# Patient Record
Sex: MALE | Race: WHITE | Employment: FULL TIME | URBAN - METROPOLITAN AREA
[De-identification: names, ages, dates, MRNs, and addresses within clinical notes are randomized per-mention and may not be internally consistent; named-entity substitution may affect disease eponyms.]

---

## 2018-11-26 ENCOUNTER — LAB ENCOUNTER (OUTPATIENT)
Dept: LAB | Age: 43
End: 2018-11-26
Attending: EMERGENCY MEDICINE
Payer: COMMERCIAL

## 2018-11-26 ENCOUNTER — OFFICE VISIT (OUTPATIENT)
Dept: FAMILY MEDICINE CLINIC | Facility: CLINIC | Age: 43
End: 2018-11-26
Payer: COMMERCIAL

## 2018-11-26 VITALS — WEIGHT: 180 LBS | HEIGHT: 70.5 IN | BODY MASS INDEX: 25.48 KG/M2

## 2018-11-26 DIAGNOSIS — Z13.0 SCREENING FOR ENDOCRINE, NUTRITIONAL, METABOLIC AND IMMUNITY DISORDER: ICD-10-CM

## 2018-11-26 DIAGNOSIS — Z28.21 REFUSED INFLUENZA VACCINE: ICD-10-CM

## 2018-11-26 DIAGNOSIS — Z13.29 SCREENING FOR ENDOCRINE, NUTRITIONAL, METABOLIC AND IMMUNITY DISORDER: ICD-10-CM

## 2018-11-26 DIAGNOSIS — Z23 NEED FOR TDAP VACCINATION: ICD-10-CM

## 2018-11-26 DIAGNOSIS — L82.1 SEBORRHEIC KERATOSES: ICD-10-CM

## 2018-11-26 DIAGNOSIS — Z13.21 SCREENING FOR ENDOCRINE, NUTRITIONAL, METABOLIC AND IMMUNITY DISORDER: ICD-10-CM

## 2018-11-26 DIAGNOSIS — F17.200 SMOKER: ICD-10-CM

## 2018-11-26 DIAGNOSIS — Z13.228 SCREENING FOR ENDOCRINE, NUTRITIONAL, METABOLIC AND IMMUNITY DISORDER: ICD-10-CM

## 2018-11-26 DIAGNOSIS — Z00.00 ENCOUNTER FOR ANNUAL PHYSICAL EXAM: Primary | ICD-10-CM

## 2018-11-26 DIAGNOSIS — Z12.5 PROSTATE CANCER SCREENING: ICD-10-CM

## 2018-11-26 PROCEDURE — 83036 HEMOGLOBIN GLYCOSYLATED A1C: CPT | Performed by: EMERGENCY MEDICINE

## 2018-11-26 PROCEDURE — 36415 COLL VENOUS BLD VENIPUNCTURE: CPT | Performed by: EMERGENCY MEDICINE

## 2018-11-26 PROCEDURE — 90715 TDAP VACCINE 7 YRS/> IM: CPT | Performed by: EMERGENCY MEDICINE

## 2018-11-26 PROCEDURE — 84153 ASSAY OF PSA TOTAL: CPT | Performed by: EMERGENCY MEDICINE

## 2018-11-26 PROCEDURE — 80050 GENERAL HEALTH PANEL: CPT | Performed by: EMERGENCY MEDICINE

## 2018-11-26 PROCEDURE — 80061 LIPID PANEL: CPT | Performed by: EMERGENCY MEDICINE

## 2018-11-26 PROCEDURE — 99386 PREV VISIT NEW AGE 40-64: CPT | Performed by: EMERGENCY MEDICINE

## 2018-11-26 PROCEDURE — 90471 IMMUNIZATION ADMIN: CPT | Performed by: EMERGENCY MEDICINE

## 2018-11-26 NOTE — PROGRESS NOTES
Chief Complaint:   Patient presents with:  Physical: NP annual physical     HPI:   This is a 37year old male who present for a yearly annual exam      WELL-MALE EXAM     1. Age:   37   2. Have you had any of the following problems:         a.  High b • Other (Other) Mother         COPD       Allergies     Shellfish-Derived P*    SWELLING, RESPIRATORY FAILURE  Morphine                PAIN    Comment:Burning sensation      No current outpatient medications on file prior to visit.   No current facility-a safely. Schedule regular appointments with dentist.  Schedule yearly eye exam if you wear glasses/contacts. Yearly Flu Vaccine recommended. Tetanus, Diptheria and Pertussis vaccine should be given every 7-10 years.   Call or come in if there are concerns

## 2018-11-26 NOTE — PATIENT INSTRUCTIONS
Thank you for choosing [facility]  To Do:  FOR [facility] AC    · Recommend Flu shot  · Follow up yearly or as needed  · Have blood tests done  · Need to slow down and eventually stop smoking  · Follow up with dermatology for facial lesion infection – talk with your healthcare provider At routine exams   High cholesterol or triglycerides All men ages 28 and older, and younger men at high risk for coronary artery disease At least every 5 years   HIV All men At routine exams   Obesity All men with your healthcare provider PCV13: 1 dose ages 23 to 72 (protects against 13 types of pneumococcal bacteria)     PPSV23: 1 to 2 doses through age 59, or 1 dose at 72 or older (protects against 23 types of pneumococcal bacteria)      Tetanus/diphtheria/ within a month, and do it.     Keys to your quit plan  · Talk to your healthcare provider about prescription medicines and nicotine replacement products that help stop the urge to smoke.   · Join a support group or quit smoking program. Talking with others as a substitute for professional medical care. Always follow your healthcare professional's instructions.         Tips for Quitting Smoking (Cardiovascular)  Quitting smoking is a gift to yourself, one of the best things you can do to keep your heart diseas tempt you as much. · Speak to smokers around you about your intent to stop smoking so they can show consideration for you and limit their smoking around you. · Angel Ortegaes a list of  “quit benefits” in the spot where you smoke.  Put one on the refrigerator and o

## 2018-11-30 ENCOUNTER — TELEPHONE (OUTPATIENT)
Dept: FAMILY MEDICINE CLINIC | Facility: CLINIC | Age: 43
End: 2018-11-30

## 2018-11-30 DIAGNOSIS — E78.00 ELEVATED CHOLESTEROL: Primary | ICD-10-CM

## 2018-11-30 NOTE — TELEPHONE ENCOUNTER
----- Message from Jason Pozo MD sent at 11/29/2018  4:13 PM CST -----    Total cholesterol and LDL elevated. Please  on dietary changes with low cholesterol low fat diet. Also increase physical activity participate in regular exercise.    R

## 2018-11-30 NOTE — TELEPHONE ENCOUNTER
Left detailed VM for pt regarding results and instructions listed below. Pt instructed to call back with any further questions/concerns. Labs placed for 6 month repeat.

## (undated) NOTE — LETTER
06/29/19        [address]      Dear Dustin December records indicate that you have outstanding lab work and or testing that was ordered for you and has not yet been completed:  Orders Placed This Encounter      C